# Patient Record
(demographics unavailable — no encounter records)

---

## 2024-11-26 NOTE — ASSESSMENT
[FreeTextEntry1] :  Assessment/Plan: #1 Laryngitis #2 GERD  I have recommended we start him on famotidine 40mg daily before breakfast and omeprazole 40mg daily before dinner. The risks, benefits, and alternatives to care were discussed with the patient and understanding expressed. GERD is not the cause of his dysphonia but may be a worsening factor.    As he was recently on prednisone without improvement I have not recommended repeat treatment at this time.   The underlying cause of his laryngitis is unclear at this time.  He was offered the following as next steps: 1) Augmentin x 7 days 2) In office subepithelial steroid injection   The risks, benefits, and alternatives to care were discussed with the patient and understanding expressed.  He elected for option #1 and option #2.

## 2024-11-26 NOTE — PROCEDURE
[de-identified] : Stroboscopic Laryngoscopy Procedure Note: Indications: Assess laryngeal biomechanics and vocal fold oscillation. Description of Procedure: Informed consent was verbally obtained from the patient prior to the procedure. The patient was seated in the clinic chair. Topical anesthesia was achieved by first spraying the nasal cavities with 4% lidocaine and nasal decongestant. Findings: Supraglottis: no masses or lesions Glottis:  Vocal cords:                       Right: erythema and fullness                       Left:  erythema with mid fold small polyp vs scar                Mobility:                       Right:  normal                       Left:  normal                Amplitude:                       Right:  mildly decreased                       Left:  moderately decreased                Closure: complete                Wave symmetry:  asymmetric Subglottis: no masses or lesions within the visualized subglottis. Visualized airway is widely patent.

## 2024-11-26 NOTE — HISTORY OF PRESENT ILLNESS
[de-identified] : BENJI SONI is a 34 year old man who presents to the [facility] with difficulty phonating. He is referred by self. This problem has been going on for 3 months. Had chronic bronchitis 3 weeks ago.  They describe their voice as hoarse, vocal strain and unable to project his voice/yell. Denies complete loss voice, pain or SOB while speaking. Has some throat pain when really trying to project voice.  He now does NOT note periods of normal voicing. He do NOT note specific difficulties with swallowing. He does note frequent classic heartburn symptoms. Tums, lansoprazole PRN Smoking history: Never smoked cigarettes Social alcohol consumption Denies dyspnea, dysphagia, odynophagia, fevers/chills, and unintentional weight loss.  Was recently on prednisone 30mg taper approx 3 weeks ago with no improvement in voice.  VOCAL DEMANDS: His vocal demands are primarily those of generalized conversation. Patient is self employed.